# Patient Record
Sex: MALE | Race: BLACK OR AFRICAN AMERICAN | NOT HISPANIC OR LATINO | Employment: UNEMPLOYED | URBAN - METROPOLITAN AREA
[De-identification: names, ages, dates, MRNs, and addresses within clinical notes are randomized per-mention and may not be internally consistent; named-entity substitution may affect disease eponyms.]

---

## 2020-03-19 ENCOUNTER — VIRTUAL VISIT (OUTPATIENT)
Dept: FAMILY MEDICINE | Facility: CLINIC | Age: 45
End: 2020-03-19
Payer: COMMERCIAL

## 2020-03-19 DIAGNOSIS — Z76.89 ENCOUNTER TO ESTABLISH CARE: Primary | ICD-10-CM

## 2020-03-19 PROCEDURE — 99207 ZZC NO BILLABLE SERVICE THIS VISIT: CPT | Performed by: FAMILY MEDICINE

## 2020-03-19 NOTE — PROGRESS NOTES
"Mega Mckee is a 44 year old male who is being evaluated via a billable telephone visit.      The patient has been notified of following:     \"This telephone visit will be conducted via a call between you and your physician/provider. We have found that certain health care needs can be provided without the need for a physical exam.  This service lets us provide the care you need with a short phone conversation.  If a prescription is necessary we can send it directly to your pharmacy.  If lab work is needed we can place an order for that and you can then stop by our lab to have the test done at a later time.    If during the course of the call the physician/provider feels a telephone visit is not appropriate, you will not be charged for this service.\"     Mega Mckee complains of    Chief Complaint   Patient presents with     Establish Care     Referral     Dr. Swanson     Medication Request     Marijuana withdrawl medication.       I have reviewed and updated the patient's Past Medical History, Social History, Family History and Medication List.    ALLERGIES  Patient has no known allergies.      Additional provider notes:     Assessment/Plan:  1. Encounter to establish care  I explained to Mega we will need him to be seen in clinic to start cares, make any prescriptions and referrals.       There is no charge for this visit.   Phone call duration:  (NC) minutes    Danny Horne MD    "

## 2020-12-06 ENCOUNTER — HEALTH MAINTENANCE LETTER (OUTPATIENT)
Age: 45
End: 2020-12-06

## 2021-09-25 ENCOUNTER — HEALTH MAINTENANCE LETTER (OUTPATIENT)
Age: 46
End: 2021-09-25

## 2022-01-15 ENCOUNTER — HEALTH MAINTENANCE LETTER (OUTPATIENT)
Age: 47
End: 2022-01-15

## 2023-01-07 ENCOUNTER — HEALTH MAINTENANCE LETTER (OUTPATIENT)
Age: 48
End: 2023-01-07

## 2023-04-22 ENCOUNTER — HEALTH MAINTENANCE LETTER (OUTPATIENT)
Age: 48
End: 2023-04-22

## 2023-09-16 ENCOUNTER — OFFICE VISIT (OUTPATIENT)
Dept: URGENT CARE | Facility: URGENT CARE | Age: 48
End: 2023-09-16
Payer: COMMERCIAL

## 2023-09-16 VITALS
BODY MASS INDEX: 26.64 KG/M2 | DIASTOLIC BLOOD PRESSURE: 92 MMHG | OXYGEN SATURATION: 100 % | WEIGHT: 191 LBS | TEMPERATURE: 97.3 F | SYSTOLIC BLOOD PRESSURE: 154 MMHG | HEART RATE: 97 BPM

## 2023-09-16 DIAGNOSIS — M62.838 NECK MUSCLE SPASM: ICD-10-CM

## 2023-09-16 DIAGNOSIS — R07.0 THROAT PAIN: Primary | ICD-10-CM

## 2023-09-16 DIAGNOSIS — M26.609 TMJ (TEMPOROMANDIBULAR JOINT SYNDROME): ICD-10-CM

## 2023-09-16 LAB — DEPRECATED S PYO AG THROAT QL EIA: NEGATIVE

## 2023-09-16 PROCEDURE — 99203 OFFICE O/P NEW LOW 30 MIN: CPT | Performed by: FAMILY MEDICINE

## 2023-09-16 PROCEDURE — 87651 STREP A DNA AMP PROBE: CPT | Performed by: FAMILY MEDICINE

## 2023-09-16 RX ORDER — CYCLOBENZAPRINE HCL 5 MG
5-10 TABLET ORAL EVERY 8 HOURS PRN
Qty: 30 TABLET | Refills: 0 | Status: SHIPPED | OUTPATIENT
Start: 2023-09-16

## 2023-09-16 NOTE — PATIENT INSTRUCTIONS
Okay to take ibuprofen 200 mg - 4 tablets (800 mg) every 8 hours as needed.  Okay to take tylenol 500 mg - 2 tablets (1000 mg) every 6-8 hours as needed, do not exceed 3000 mg in 24 hours.  Okay to take muscle relaxant - flexeril to help with TMJ and neck muscle spasm.    Heat or ice to area    We will contact you if the throat culture is positive for strep.

## 2023-09-16 NOTE — PROGRESS NOTES
SUBJECTIVE:   Mega Mckee is a 47 year old male presenting with a chief complaint of sore throat and stiff neck.  No fever, no cough  Onset of symptoms was 1 hour(s) ago.  Course of illness is worsening.    Severity moderate  Current and Associated symptoms: neck pain, sore throat  Treatment measures tried include None tried.  Predisposing factors include None.    Was stretching his neck and developed pain in left side, would radiate upward to ear and to his throat.    Does grind his teeth    No close positive strep exposure    Past Medical History:   Diagnosis Date    Hypertension      Current Outpatient Medications   Medication Sig Dispense Refill    ibuprofen (ADVIL,MOTRIN) 600 MG tablet Take 1 tablet (600 mg) by mouth every 6 hours as needed for pain (Patient not taking: Reported on 3/19/2020) 30 tablet 1     Social History     Tobacco Use    Smoking status: Every Day     Packs/day: 0.50     Types: Cigarettes    Smokeless tobacco: Never   Substance Use Topics    Alcohol use: Not Currently       ROS:  Review of systems negative except as stated above.    OBJECTIVE:  BP (!) 154/92   Pulse 97   Temp 97.3  F (36.3  C)   Wt 86.6 kg (191 lb)   SpO2 100%   BMI 26.64 kg/m    GENERAL APPEARANCE: healthy, alert and no distress  EYES: EOMI,  PERRL, conjunctiva clear  HENT: ear canals and TM's normal.  Nose and mouth without ulcers, erythema or lesions.  Mild TMJ tenderness, click noted on right  NECK: tenderness on left sternocleidomastoid muscle  RESP: lungs clear to auscultation - no rales, rhonchi or wheezes  CV: regular rates and rhythm  SKIN: no suspicious lesions or rashes    Results for orders placed or performed in visit on 09/16/23   Streptococcus A Rapid Screen w/Reflex to PCR - Clinic Collect     Status: Normal    Specimen: Throat; Swab   Result Value Ref Range    Group A Strep antigen Negative Negative       ASSESSMENT/PLAN:  (R07.0) Throat pain  (primary encounter diagnosis)  Plan: Streptococcus A  Rapid Screen w/Reflex to PCR -         Clinic Collect, Group A Streptococcus PCR         Throat Swab            (M26.609) TMJ (temporomandibular joint syndrome)  Comment: left  Plan: cyclobenzaprine (FLEXERIL) 5 MG tablet            (M62.838) Neck muscle spasm  Comment: left sternocleidomastoid muscle  Plan: cyclobenzaprine (FLEXERIL) 5 MG tablet            Reassurance given, reviewed that symptom presentation most likely due to muscle strain/spasm of neck.  Patient reported grinding of teeth and reviewed that this predispose to TMJ muscle also getting aggravated.  RX flexeril given for treatment, encourage tylenol and ibuprofen for discomfort, heat and ice to area.  Will follow up on throat culture and treat if positive for strep.    Follow up with primary provider if no improvement of symptoms in 1-2 weeks    Jose A Fung MD  September 16, 2023 5:46 PM

## 2023-09-17 LAB — GROUP A STREP BY PCR: NOT DETECTED

## 2024-06-25 ENCOUNTER — HOSPITAL ENCOUNTER (EMERGENCY)
Facility: CLINIC | Age: 49
Discharge: HOME | End: 2024-06-25
Attending: EMERGENCY MEDICINE | Admitting: EMERGENCY MEDICINE

## 2024-06-25 ENCOUNTER — APPOINTMENT (OUTPATIENT)
Dept: GENERAL RADIOLOGY | Facility: CLINIC | Age: 49
End: 2024-06-25

## 2024-06-25 VITALS
RESPIRATION RATE: 16 BRPM | HEART RATE: 73 BPM | SYSTOLIC BLOOD PRESSURE: 137 MMHG | BODY MASS INDEX: 26.31 KG/M2 | OXYGEN SATURATION: 99 % | WEIGHT: 194.22 LBS | TEMPERATURE: 99 F | DIASTOLIC BLOOD PRESSURE: 84 MMHG | HEIGHT: 72 IN

## 2024-06-25 DIAGNOSIS — I10 HYPERTENSION, UNSPECIFIED TYPE: ICD-10-CM

## 2024-06-25 DIAGNOSIS — R07.9 NONSPECIFIC CHEST PAIN: ICD-10-CM

## 2024-06-25 LAB
ANION GAP SERPL CALCULATED.3IONS-SCNC: 13 MMOL/L (ref 7–15)
ATRIAL RATE - MUSE: 68 BPM
BUN SERPL-MCNC: 8.5 MG/DL (ref 6–20)
CALCIUM SERPL-MCNC: 9.4 MG/DL (ref 8.6–10)
CHLORIDE SERPL-SCNC: 104 MMOL/L (ref 98–107)
CREAT SERPL-MCNC: 1.11 MG/DL (ref 0.67–1.17)
DEPRECATED HCO3 PLAS-SCNC: 24 MMOL/L (ref 22–29)
DIASTOLIC BLOOD PRESSURE - MUSE: NORMAL MMHG
EGFRCR SERPLBLD CKD-EPI 2021: 82 ML/MIN/1.73M2
ERYTHROCYTE [DISTWIDTH] IN BLOOD BY AUTOMATED COUNT: 12.3 % (ref 10–15)
GLUCOSE SERPL-MCNC: 108 MG/DL (ref 70–99)
HCT VFR BLD AUTO: 42.6 % (ref 40–53)
HGB BLD-MCNC: 13.2 G/DL (ref 13.3–17.7)
HOLD SPECIMEN: NORMAL
HOLD SPECIMEN: NORMAL
INTERPRETATION ECG - MUSE: NORMAL
MCH RBC QN AUTO: 29.6 PG (ref 26.5–33)
MCHC RBC AUTO-ENTMCNC: 31 G/DL (ref 31.5–36.5)
MCV RBC AUTO: 96 FL (ref 78–100)
P AXIS - MUSE: 64 DEGREES
PLATELET # BLD AUTO: 258 10E3/UL (ref 150–450)
POTASSIUM SERPL-SCNC: 4.1 MMOL/L (ref 3.4–5.3)
PR INTERVAL - MUSE: 186 MS
QRS DURATION - MUSE: 82 MS
QT - MUSE: 348 MS
QTC - MUSE: 370 MS
R AXIS - MUSE: 43 DEGREES
RBC # BLD AUTO: 4.46 10E6/UL (ref 4.4–5.9)
SODIUM SERPL-SCNC: 141 MMOL/L (ref 135–145)
SYSTOLIC BLOOD PRESSURE - MUSE: NORMAL MMHG
T AXIS - MUSE: 13 DEGREES
TROPONIN T SERPL HS-MCNC: 8 NG/L
VENTRICULAR RATE- MUSE: 68 BPM
WBC # BLD AUTO: 11.4 10E3/UL (ref 4–11)

## 2024-06-25 PROCEDURE — 93005 ELECTROCARDIOGRAM TRACING: CPT

## 2024-06-25 PROCEDURE — 80048 BASIC METABOLIC PNL TOTAL CA: CPT | Performed by: EMERGENCY MEDICINE

## 2024-06-25 PROCEDURE — 84484 ASSAY OF TROPONIN QUANT: CPT | Performed by: EMERGENCY MEDICINE

## 2024-06-25 PROCEDURE — 99285 EMERGENCY DEPT VISIT HI MDM: CPT | Mod: 25

## 2024-06-25 PROCEDURE — 85027 COMPLETE CBC AUTOMATED: CPT | Performed by: EMERGENCY MEDICINE

## 2024-06-25 PROCEDURE — 71046 X-RAY EXAM CHEST 2 VIEWS: CPT

## 2024-06-25 PROCEDURE — 36415 COLL VENOUS BLD VENIPUNCTURE: CPT | Performed by: EMERGENCY MEDICINE

## 2024-06-25 ASSESSMENT — COLUMBIA-SUICIDE SEVERITY RATING SCALE - C-SSRS
6. HAVE YOU EVER DONE ANYTHING, STARTED TO DO ANYTHING, OR PREPARED TO DO ANYTHING TO END YOUR LIFE?: NO
1. IN THE PAST MONTH, HAVE YOU WISHED YOU WERE DEAD OR WISHED YOU COULD GO TO SLEEP AND NOT WAKE UP?: NO
2. HAVE YOU ACTUALLY HAD ANY THOUGHTS OF KILLING YOURSELF IN THE PAST MONTH?: NO

## 2024-06-25 ASSESSMENT — ACTIVITIES OF DAILY LIVING (ADL)
ADLS_ACUITY_SCORE: 33
ADLS_ACUITY_SCORE: 35
ADLS_ACUITY_SCORE: 35

## 2024-06-25 NOTE — ED TRIAGE NOTES
Pt with c/o mid-sternal, non-radiating CP since Sat. Denies SOB, nausea, diaphoresis, or HOMER. ABC intact.

## 2024-06-25 NOTE — ED PROVIDER NOTES
ED ATTENDING PHYSICIAN NOTE:   I evaluated this patient in conjunction with Phylicia Sanchez PA-C  I have participated in the care of the patient and personally performed key elements of the history, exam, and medical decision making.      HPI:   Mega Mckee is a 48 year old male with history of hypertension who presents with intermittent episodes of left-sided chest pressure for the past several days.  The pain seems to radiate into his left arm where he gets some tingling as well.  Symptoms do not seem to be worse with exertion.  He denies any associated shortness of breath, diaphoresis, nausea/vomiting.  He has had similar episodes in the past but this is lasting longer than usual which prompted her to come to the ER.  He denies any other symptoms including no fever, cough, hemoptysis, leg swelling, calf pain.        Independent Historian:   None    Review of External Notes:   Reviewed urgent care visit from earlier today before the patient was seen in the ER.  I reviewed outside ER visit from 6/25/2018 when patient was seen for very similar symptoms today.  He had negative workup at that time.     EXAM:   Physical Exam  Vitals and nursing note reviewed.   Constitutional:       General: He is not in acute distress.     Appearance: He is not ill-appearing.   HENT:      Head: Normocephalic and atraumatic.      Right Ear: External ear normal.      Left Ear: External ear normal.      Nose: Nose normal.   Eyes:      Conjunctiva/sclera: Conjunctivae normal.   Cardiovascular:      Rate and Rhythm: Normal rate and regular rhythm.      Pulses: Normal pulses.      Heart sounds: No murmur heard.  Pulmonary:      Effort: Pulmonary effort is normal. No respiratory distress.      Breath sounds: No wheezing, rhonchi or rales.   Abdominal:      General: Abdomen is flat. There is no distension.      Palpations: Abdomen is soft.      Tenderness: There is no abdominal tenderness. There is no guarding or rebound.    Musculoskeletal:         General: No swelling or deformity.      Cervical back: Normal range of motion and neck supple.   Skin:     General: Skin is warm and dry.      Findings: No rash.   Neurological:      Mental Status: He is alert and oriented to person, place, and time.   Psychiatric:         Behavior: Behavior normal.           Independent Interpretation (X-rays, CTs, rhythm strip):  CXR shows no obvious infiltrate or pneumothorax per my read    Consultations/Discussion of Management or Tests:  None     Social Determinants of Health affecting care:   None     MEDICAL DECISION MAKING/ASSESSMENT AND PLAN:   48-year-old male presenting with nonspecific chest pain.  Unclear etiology at this point.  Symptoms do not sound overly concerning for ACS given that he has had similar symptoms for years and his symptoms are not exertional or associated with any concerning symptoms.  EKG shows no ischemic changes and his troponin is normal despite days of symptoms.  Also low risk also have low suspicion for PE given he is low risk and PERC negative.  Chest x-ray shows no pneumothorax or infiltrate and no signs of mediastinal widening.  Reassured the patient and recommended that he follow-up closely with his primary care physician.  Given his untreated hypertension and unclear etiology of pain, we will order an outpatient stress test and recommend that he follow-up with primary care and cardiology.  We discussed return precautions.     DIAGNOSIS:     ICD-10-CM    1. Hypertension, unspecified type  I10 Exercise Stress Echocardiogram     Follow-Up with Cardiology      2. Nonspecific chest pain  R07.9                DISPOSITION:   Discharged.     Scribe Disclosure:  Anais CHEUNG, am serving as a scribe at 11:16 AM on 6/25/2024 to document services personally performed by Sid Rainey MD based on my observations and the provider's statements to me.   6/25/2024  [facility] EMERGENCY DEPT     Sid Rainey,  MD  06/25/24 5972

## 2024-06-25 NOTE — ED PROVIDER NOTES
Emergency Department Note      History of Present Illness     Chief Complaint  Chest Pain    HPI  Mega Mckee is a 48 year old male with a past medical history of hypertension who presents to the emergency department today for evaluation of chest pain.  On Saturday evening the patient had a gradual onset of centralized chest pain.  This occurred when he was at rest.  He took ibuprofen which did not help his pain.  The pain persisted until Sunday morning, it subsided for a moment then became worse when he was driving.  The pain is described as a tightness.  This pain has been constant since Saturday.  At the time of interview it is rated an 8/10 in severity.  Additionally has been noting an occasional cough.  No fevers or chills.  States that this has happened to him before in the past however the pain has not lasted this long.  Notes that occasionally he will feel tingling down his left arm.  Does not note that anything particularly makes it worse or better.  Does not have associated shortness of breath, fevers, chills, diaphoresis, nausea, vomiting.  He does smoke half pack of cigarettes per day, has been doing this since he was 17 years old.  He does not know if he has a family history of cardiovascular disease.  No recent long car or plane trips.  No recent surgeries.  No hormone use or history of cancer.  No hemoptysis.  Reports that he no longer is on his antihypertensive medication as this made him feel unwell.    Independent Historian  None    Review of External Notes  None  Past Medical History   Medical History and Problem List  Past Medical History:   Diagnosis Date    Hypertension        Medications  cyclobenzaprine (FLEXERIL) 5 MG tablet  ibuprofen (ADVIL,MOTRIN) 600 MG tablet        Surgical History   No past surgical history on file.  Physical Exam   Patient Vitals for the past 24 hrs:   BP Temp Temp src Pulse Resp SpO2 Height Weight   06/25/24 1308 137/84 -- -- 73 16 99 % -- --   06/25/24 0914  (!) 146/95 -- -- -- -- -- -- --   06/25/24 0913 -- 99  F (37.2  C) Temporal 70 18 100 % 1.829 m (6') 88.1 kg (194 lb 3.6 oz)     Physical Exam  General: Awake, alert, non-toxic.  Head:  Scalp is atraumatic.   Eyes:  Conjunctiva normal, PERRL  ENT:  The external nose and ears are normal.     Oropharynx clear, uvula midline.  Neck:  Normal range of motion without rigidity.  CV:  Regular rate and rhythm    No pathologic murmur, rubs, or gallops.  Resp:  Breath sounds are clear bilaterally    Non-labored, no retractions or accessory muscle use  Abdomen: Abdomen is soft, no distension, no tenderness, no masses. No CVA tenderness.  MS:  No lower extremity edema/swelling. No midline cervical, thoracic, or lumbar tenderness.  Extremities without joint swelling or redness.  Skin:  Warm and dry, No rash or lesions noted.  Neuro:  Alert and oriented.  GCS 15 Moves all extremities normal.  No facial asymmetry. Gait normal.  Psych: Awake. Alert. Normal affect. Appropriate interactions.    Diagnostics   Lab Results   Labs Ordered and Resulted from Time of ED Arrival to Time of ED Departure   CBC WITH PLATELETS - Abnormal       Result Value    WBC Count 11.4 (*)     RBC Count 4.46      Hemoglobin 13.2 (*)     Hematocrit 42.6      MCV 96      MCH 29.6      MCHC 31.0 (*)     RDW 12.3      Platelet Count 258     BASIC METABOLIC PANEL - Abnormal    Sodium 141      Potassium 4.1      Chloride 104      Carbon Dioxide (CO2) 24      Anion Gap 13      Urea Nitrogen 8.5      Creatinine 1.11      GFR Estimate 82      Calcium 9.4      Glucose 108 (*)    TROPONIN T, HIGH SENSITIVITY - Normal    Troponin T, High Sensitivity 8         Imaging  XR Chest 2 Views   Preliminary Result   IMPRESSION: No acute cardiopulmonary disease.      Exercise Stress Echocardiogram    (Results Pending)       ECG results from 06/25/24   EKG 12 lead     Value    Systolic Blood Pressure     Diastolic Blood Pressure     Ventricular Rate 68    Atrial Rate 68    ND  Interval 186    QRS Duration 82        QTc 370    P Axis 64    R AXIS 43    T Axis 13    Interpretation ECG      Sinus rhythm  Nonspecific T wave abnormality  Abnormal ECG  When compared with ECG of 21-FEB-2014 11:21,  No significant change was found       Independent Interpretation  None  ED Course    Medications Administered  Medications - No data to display    Procedures  Procedures     Discussion of Management  None    Social Determinants of Health adding to complexity of care  None    ED Course  ED Course as of 06/25/24 1430   Tue Jun 25, 2024   1250 I rechecked the patient and explained findings.  I discussed plan for discharge home.       Medical Decision Making / Diagnosis   CMS Diagnoses: None    MIPS     None    MDM  Mega Mckee is a 48 year old male who presented to the ED for evaluation of chest pain for 4 days. See HPI for further details. Differential diagnosis includes but not limited to ACS, pneumonia, pericarditis, PE, pleural effusion, among others. EKG unchanged from previous without acute ST/T wave changes. Patient's chest pain has been present for 3 days now, troponin negative.  Due to the length of his chest pain I do not feel it is not necessary to obtain a delta troponin at this time.  Low suspicion for ACS as the cause of his pain today.  Heart score of 3 which is in the low risk category for adverse cardiac event.  Electrolytes unremarkable.  Chest x-ray without evidence of pneumonia, pneumothorax, widening of the mediastinum.  Patient is PERC negative, therefore low risk for pulmonary embolism.  Recommended that the patient follow-up with his primary care provider to initiate medication management for his hypertension.  The patient does appear quite anxious about his chest pain therefore a referral for a stress echocardiogram and cardiology referral was placed.  At this time I do think the patient is stable for discharge.  All questions answered.    Disposition  The patient was  discharged.     ICD-10 Codes:    ICD-10-CM    1. Hypertension, unspecified type  I10 Exercise Stress Echocardiogram     Follow-Up with Cardiology      2. Nonspecific chest pain  R07.9            Discharge Medications  Discharge Medication List as of 6/25/2024  1:04 PM            ZENIA Laughlin Alexandra, PA-C  06/25/24 1432

## 2024-06-29 ENCOUNTER — HEALTH MAINTENANCE LETTER (OUTPATIENT)
Age: 49
End: 2024-06-29

## 2025-05-24 ENCOUNTER — OFFICE VISIT (OUTPATIENT)
Dept: URGENT CARE | Facility: URGENT CARE | Age: 50
End: 2025-05-24
Payer: COMMERCIAL

## 2025-05-24 VITALS
DIASTOLIC BLOOD PRESSURE: 90 MMHG | BODY MASS INDEX: 26.96 KG/M2 | WEIGHT: 192.6 LBS | HEART RATE: 77 BPM | RESPIRATION RATE: 18 BRPM | HEIGHT: 71 IN | TEMPERATURE: 97.6 F | SYSTOLIC BLOOD PRESSURE: 136 MMHG | OXYGEN SATURATION: 97 %

## 2025-05-24 DIAGNOSIS — M62.838 MUSCLE SPASMS OF NECK: Primary | ICD-10-CM

## 2025-05-24 PROCEDURE — 3080F DIAST BP >= 90 MM HG: CPT | Performed by: PHYSICIAN ASSISTANT

## 2025-05-24 PROCEDURE — 99213 OFFICE O/P EST LOW 20 MIN: CPT | Performed by: PHYSICIAN ASSISTANT

## 2025-05-24 PROCEDURE — 3075F SYST BP GE 130 - 139MM HG: CPT | Performed by: PHYSICIAN ASSISTANT

## 2025-05-24 RX ORDER — DICLOFENAC POTASSIUM 50 MG/1
50 TABLET, FILM COATED ORAL 3 TIMES DAILY PRN
Qty: 30 TABLET | Refills: 0 | Status: SHIPPED | OUTPATIENT
Start: 2025-05-24

## 2025-05-24 RX ORDER — CYCLOBENZAPRINE HCL 5 MG
5-10 TABLET ORAL 3 TIMES DAILY PRN
Qty: 30 TABLET | Refills: 0 | Status: SHIPPED | OUTPATIENT
Start: 2025-05-24

## 2025-05-24 NOTE — PROGRESS NOTES
SUBJECTIVE:  Mega Mckee is a 49 year old male who comes in for acute onset of left sided neck pain.  Patient states that he woke up today after turning his head and has a spasm on the left side of his neck.  Tender to the touch.  He is requesting muscle relaxers as he has used them in the past with success.  He does not take any medication.  Does have mild pain into his ear.  No headache.  Denies any vision changes or neurological symptoms.  There is no numbness or tingling in the upper extremity and no weakness noted.  Denies any trauma.  He is otherwise at baseline health.    Past Medical History:   Diagnosis Date    Hypertension      There is no problem list on file for this patient.    Current Outpatient Medications   Medication Sig Dispense Refill    ibuprofen (ADVIL,MOTRIN) 600 MG tablet Take 1 tablet (600 mg) by mouth every 6 hours as needed for pain 30 tablet 1     No current facility-administered medications for this visit.     Social History     Socioeconomic History    Marital status: Single     Spouse name: Not on file    Number of children: Not on file    Years of education: Not on file    Highest education level: Not on file   Occupational History    Not on file   Tobacco Use    Smoking status: Every Day     Current packs/day: 0.50     Types: Cigarettes    Smokeless tobacco: Never   Substance and Sexual Activity    Alcohol use: Not Currently    Drug use: Yes     Types: Marijuana     Comment: currently in treatment 3/18/2020    Sexual activity: Yes     Partners: Female   Other Topics Concern    Not on file   Social History Narrative    Not on file     Social Drivers of Health     Financial Resource Strain: Not on file   Food Insecurity: Not on file   Transportation Needs: Not on file   Physical Activity: Not on file   Stress: Not on file   Social Connections: Not on file   Interpersonal Safety: Not on file   Housing Stability: Not on file     [ROS  negative other than stated above    Exam:  GENERAL  APPEARANCE: healthy, alert and no distress  EYES: EOMI,  PERRL  NECK: Tenderness on the left side of the neck that extends into the trapezius to the base of the neck.  He does have full range of motion with no rigidity but muscle spasms are noted.  Tenderness  RESP: lungs clear to auscultation - no rales, rhonchi or wheezes  CV: regular rates and rhythm, normal S1 S2, no S3 or S4 and no murmur, click or rub -  SKIN: no suspicious lesions or rashes  NEURO: Normal strength and tone, sensory exam grossly normal, mentation intact and speech normal   strength normal bilaterally upper extremities    assessment/plan:  (M62.838) Muscle spasms of neck  (primary encounter diagnosis)  Comment:   Plan: cyclobenzaprine (FLEXERIL) 5 MG tablet,         diclofenac (CATAFLAM) 50 MG tablet        Patient with left-sided neck pain in setting of waking up and turning his head a lot.  Does have some mild muscle spasms on the left.  Advised ice along with heat and gentle stretching.  Anti-inflammatories along with muscle relaxers were given.  There is no red flag signs at this time.  Will continue monitor and follow-up as needed

## 2025-05-24 NOTE — PROGRESS NOTES
Urgent Care Clinic Visit    No chief complaint on file.             5/24/2025     9:29 AM   Additional Questions   Roomed by AMELIA WARE   Accompanied by NONE         5/24/2025     9:29 AM   Patient Reported Additional Medications   Patient reports taking the following new medications NONE

## 2025-07-12 ENCOUNTER — HEALTH MAINTENANCE LETTER (OUTPATIENT)
Age: 50
End: 2025-07-12